# Patient Record
Sex: MALE | Race: BLACK OR AFRICAN AMERICAN | NOT HISPANIC OR LATINO | URBAN - METROPOLITAN AREA
[De-identification: names, ages, dates, MRNs, and addresses within clinical notes are randomized per-mention and may not be internally consistent; named-entity substitution may affect disease eponyms.]

---

## 2020-07-07 ENCOUNTER — EMERGENCY (EMERGENCY)
Facility: HOSPITAL | Age: 20
LOS: 1 days | Discharge: LEFT BEFORE DISCHARGE | End: 2020-07-07
Attending: EMERGENCY MEDICINE
Payer: SELF-PAY

## 2020-07-07 VITALS
RESPIRATION RATE: 18 BRPM | TEMPERATURE: 98 F | SYSTOLIC BLOOD PRESSURE: 118 MMHG | HEART RATE: 82 BPM | DIASTOLIC BLOOD PRESSURE: 72 MMHG | WEIGHT: 154.98 LBS | OXYGEN SATURATION: 99 % | HEIGHT: 72 IN

## 2020-07-07 PROCEDURE — L9991: CPT

## 2021-10-22 ENCOUNTER — EMERGENCY (EMERGENCY)
Facility: HOSPITAL | Age: 21
LOS: 1 days | Discharge: HOME | End: 2021-10-22
Attending: EMERGENCY MEDICINE
Payer: MEDICAID

## 2021-10-22 VITALS
DIASTOLIC BLOOD PRESSURE: 74 MMHG | WEIGHT: 160.06 LBS | TEMPERATURE: 98 F | HEART RATE: 56 BPM | HEIGHT: 72 IN | SYSTOLIC BLOOD PRESSURE: 125 MMHG | OXYGEN SATURATION: 100 % | RESPIRATION RATE: 18 BRPM

## 2021-10-22 PROCEDURE — 99283 EMERGENCY DEPT VISIT LOW MDM: CPT

## 2021-10-22 PROCEDURE — 73110 X-RAY EXAM OF WRIST: CPT | Mod: 26,LT

## 2021-10-22 PROCEDURE — 99283 EMERGENCY DEPT VISIT LOW MDM: CPT | Mod: 25

## 2021-10-22 PROCEDURE — 73110 X-RAY EXAM OF WRIST: CPT

## 2021-10-22 RX ORDER — IBUPROFEN 200 MG
600 TABLET ORAL ONCE
Refills: 0 | Status: COMPLETED | OUTPATIENT
Start: 2021-10-22 | End: 2021-10-22

## 2021-10-22 RX ADMIN — Medication 600 MILLIGRAM(S): at 20:03

## 2021-10-22 NOTE — ED ADULT TRIAGE NOTE - CHIEF COMPLAINT QUOTE
Pt stated he was playing basketball he jumped up high and when he came down he landed on his left arm, denies hitting head or loc. Compliant of pain starting from below his left elbow down his left arm, left wrist and left fingers.

## 2021-10-22 NOTE — ED PROVIDER NOTE - MUSCULOSKELETAL, MLM
Tenderness to palpation along the left wrist. No scaphoid tenderness. No deformities noted. No forearm pain or swelling. Elbows normal with full range of motion.

## 2021-10-22 NOTE — ED PROVIDER NOTE - PROGRESS NOTE DETAILS
vargas: xr shows no fracture. no dislocation  dx left wrist sprain. motrin for pain, continue using wrist immobilizer. elevate extremity. see ortho if persist past 2 week. left ambulatory.  return precautions given.

## 2021-10-22 NOTE — ED PROVIDER NOTE - CLINICAL SUMMARY MEDICAL DECISION MAKING FREE TEXT BOX
Likely wrist strain/ sprain. Will perform X-ray, give Motrin for pain, and provide wrist immobilizer.

## 2022-08-09 NOTE — ED PROVIDER NOTE - CONTEXT
CONTACT INFORMATION:  The main office phone number is [phone]. For emergencies after hours and on weekends, this number will convert over to our answering service and the on call provider will answer. Please try to keep non emergent phone calls/ questions to office hours 9am-5pm Monday through Friday.      [employer]  As an alternative, you can sign up and use the Epic MyChart system for more direct and quicker access for non emergent questions/ problems.  [facility] allows you to send messages to your doctor, view your test results, renew your prescriptions, schedule appointments, and more. To sign up, go to [employer] and click on the Sign Up Now link in the New User? box. Enter your [employer] Activation Code exactly as it appears below along with the last four digits of your Social Security Number and your Date of Birth () to complete the sign-up process. If you do not sign up before the expiration date, you must request a new code.     [employer] Activation Code: Activation code not generated  Current [employer] Status: Active     If you have questions, you can email [email or web address] or call [phone] to talk to our [employer] staff. Remember, [employer] is NOT to be used for urgent needs. For medical emergencies, dial 911.     IF YOU SMOKE OR USE TOBACCO PLEASE READ THE FOLLOWING:  Why is smoking bad for me?  Smoking increases the risk of heart disease, lung disease, vascular disease, stroke, and cancer. If you smoke, STOP!        IF YOU SMOKE OR USE TOBACCO PLEASE READ THE FOLLOWING:  Why is smoking bad for me?  Smoking increases the risk of heart disease, lung disease, vascular disease, stroke, and cancer. If you smoke, STOP!     For more information:  Quit Now [place]  -QUIT-NOW  [email or web address]    while playing basketball

## 2022-09-01 NOTE — ED ADULT NURSE NOTE - FINAL NURSING ELECTRONIC SIGNATURE
-- DO NOT REPLY / DO NOT REPLY ALL --  -- Message is from the [facility]--    General Patient Message      Reason for Call: patient is requesting a callback to discuss the following:  - Blood results  - Physical therapy referral (not received)  - shoulder and leg pain.    Caller Information       Type Contact Phone    01/31/2020 12:39 PM Phone (Incoming) Elvia Leroy (Self) [phone] (W)          Alternative phone number: [phone]    Turnaround time given to caller:   \"This message will be sent to [state Provider's name]. The clinical team will fulfill your request as soon as they review your message when the office opens tomorrow.\"}    
DOS 6/16/22 Left total knee arthroplasty    Medication(s) Requested: Tramadol  Last office visit: 8/10/22  Last refill: 8/22/22  Is the patient due for refill of this medication(s): YES as of 9/5/22  PDMP review: PDMP reviewed and appropriate, routed to provider      
22-Oct-2021 21:57

## 2024-09-27 NOTE — ED PROVIDER NOTE - OBJECTIVE STATEMENT
Left arm;
21 year old male with PMHx of asthma and no significant PSHx presenting to the ED with complaints of left wrist pain radiating to his proximal forearm today. Patient reports onset of the pain after landing onto his flexed left arm after falling backwards while jumping during a basketball game. Patient endorses that he did not hit his head during the fall. Patient denies any numbness, tingling, and all other acute complaints. Patient is only complaining of pain to his wrist. Patient otherwise notes that he did not take any medication to alleviate the pain prior to arrival. NKDA.